# Patient Record
Sex: MALE | Race: WHITE | HISPANIC OR LATINO | Employment: FULL TIME | URBAN - METROPOLITAN AREA
[De-identification: names, ages, dates, MRNs, and addresses within clinical notes are randomized per-mention and may not be internally consistent; named-entity substitution may affect disease eponyms.]

---

## 2018-10-20 ENCOUNTER — OFFICE VISIT (OUTPATIENT)
Dept: URGENT CARE | Facility: CLINIC | Age: 63
End: 2018-10-20

## 2018-10-20 ENCOUNTER — HOSPITAL ENCOUNTER (EMERGENCY)
Facility: MEDICAL CENTER | Age: 63
End: 2018-10-20
Attending: EMERGENCY MEDICINE

## 2018-10-20 VITALS
WEIGHT: 180 LBS | HEIGHT: 64 IN | SYSTOLIC BLOOD PRESSURE: 148 MMHG | DIASTOLIC BLOOD PRESSURE: 80 MMHG | TEMPERATURE: 98.3 F | OXYGEN SATURATION: 93 % | BODY MASS INDEX: 30.73 KG/M2 | RESPIRATION RATE: 16 BRPM | HEART RATE: 69 BPM

## 2018-10-20 VITALS
SYSTOLIC BLOOD PRESSURE: 160 MMHG | OXYGEN SATURATION: 97 % | RESPIRATION RATE: 16 BRPM | DIASTOLIC BLOOD PRESSURE: 94 MMHG | TEMPERATURE: 96.7 F | WEIGHT: 180.56 LBS | HEART RATE: 72 BPM

## 2018-10-20 DIAGNOSIS — H15.002: ICD-10-CM

## 2018-10-20 DIAGNOSIS — H15.102: ICD-10-CM

## 2018-10-20 DIAGNOSIS — H57.12 EYE PAIN, LEFT: ICD-10-CM

## 2018-10-20 DIAGNOSIS — H57.89 REDNESS OF EYE, LEFT: ICD-10-CM

## 2018-10-20 PROCEDURE — 99203 OFFICE O/P NEW LOW 30 MIN: CPT | Performed by: FAMILY MEDICINE

## 2018-10-20 PROCEDURE — 302449 STATCHG TRIAGE ONLY (STATISTIC)

## 2018-10-20 PROCEDURE — 303754 HCHG EYE PATCH

## 2018-10-20 NOTE — PROGRESS NOTES
"Subjective:      CC: Thiago Ortega is a 63 y.o. male who presents with Conjunctivitis (x3 days, patients presents with severe redness, swelling, itching, burning )      HPI:  This is a new problem. Thiago Ortega is a 63 y.o. male who presents for today with his daughter-in-law for evaluation of left eye pain, swelling, and redness.  He reports that he first noticed some redness and \"burning pain\" of his left eye when he woke up on Thursday. He says that since that time it has been gradually worsening and he also now has pain around his his and is unable to keep it open. He says it has been watering a lot and that his vision is blurry with the watery discharge but that his vision returns to normal if the discharge is cleared. He denies any injury to the eye or recent illnesses. He does not wear contacts. He denies fever/chills, chest pain, shortness of breath, or lightheadedness/dizziness.        Review of Systems   Constitutional: Negative for chills, fever and malaise/fatigue.   HENT: Negative for congestion, ear pain and sore throat.    Eyes: Positive for blurred vision, pain, discharge (Watery) and redness.   Respiratory: Negative for cough and shortness of breath.    Cardiovascular: Negative for chest pain and palpitations.   Gastrointestinal: Negative for abdominal pain, constipation, diarrhea, nausea and vomiting.   Musculoskeletal: Negative for myalgias.   Neurological: Negative for dizziness, weakness and headaches.         PMH:  has no past medical history on file.  MEDS: No current outpatient prescriptions on file.  ALLERGIES: No Known Allergies  SURGHX: History reviewed. No pertinent surgical history.  SOCHX:  reports that he has never smoked. He has never used smokeless tobacco. He reports that he does not drink alcohol or use drugs.  FH: Family history was reviewed, no pertinent findings to report     Objective:     /80   Pulse 69   Temp 36.8 °C (98.3 °F)   Resp 16   Ht 1.626 m (5' " "4\")   Wt 81.6 kg (180 lb)   SpO2 93%   BMI 30.90 kg/m²        Physical Exam   Constitutional: He is oriented to person, place, and time. He appears well-developed and well-nourished.   HENT:   Head: Normocephalic and atraumatic.   Right Ear: Tympanic membrane, external ear and ear canal normal.   Left Ear: Tympanic membrane, external ear and ear canal normal.   Nose: Nose normal.   Mouth/Throat: Uvula is midline and oropharynx is clear and moist.   Eyes: Pupils are equal, round, and reactive to light. Lids are everted and swept, no foreign bodies found. Left eye exhibits discharge. Left conjunctiva is injected. Left conjunctiva has a hemorrhage.   Fundoscopic exam:       The right eye shows red reflex.        The left eye shows red reflex.   Slit lamp exam:       The left eye shows no corneal abrasion, no corneal ulcer, no foreign body, no hyphema, no hypopyon and no fluorescein uptake.   Right eye normal. Left eye exhibits some mild swelling of the upper and lower eyelids. Patient can not keep that eye open on his own due to discomfort. The eye is diffusely injected with a subconjunctival hemorrhage noted inferior to the cornea. The medial side of the cornea appears hazy. There is some watery discharge noted as well as some crusting of the eyelids. The surrounding orbit is mildly TTP but is non-erythematous. Patient reports some minor discomfort with EOMs.    Neck: Normal range of motion. Neck supple.   Cardiovascular: Normal rate, regular rhythm and normal heart sounds.    No murmur heard.  Pulmonary/Chest: Effort normal and breath sounds normal. He has no wheezes.   Lymphadenopathy:        Head (left side): Preauricular adenopathy present.     He has no cervical adenopathy.   Neurological: He is alert and oriented to person, place, and time.   Skin: Skin is warm and dry. Capillary refill takes less than 2 seconds.   Psychiatric: He has a normal mood and affect. His behavior is normal. Judgment normal.   Vitals " reviewed.         Assessment/Plan:     1. Redness of eye, left    2. Eye pain, left    3. Scleritis and episcleritis, left      Advised patient that based on his current symptoms and physical exam findings I suspect that he may have a component of scleritis and advised him to go to the ED to potentially be evaluated by an ophthalmologist. Patient's daughter in law will drive him over to the ED. Report was called over to an emergency room provider.    Case reviewed and discussed with Dr. Maynard during Tana Navarrete PA-C's training period

## 2018-10-20 NOTE — ED NOTES
RN went into room to assess patient. Patient requesting to leave and go see his eye MD instead. RN explained process of seeing MD here, patient insisting on leaving. AMA form signed. Patient ambulatory out of ED.

## 2018-10-21 ASSESSMENT — ENCOUNTER SYMPTOMS
DIZZINESS: 0
EYE PAIN: 1
MYALGIAS: 0
HEADACHES: 0
COUGH: 0
SORE THROAT: 0
VOMITING: 0
CONSTIPATION: 0
CHILLS: 0
PALPITATIONS: 0
WEAKNESS: 0
EYE REDNESS: 1
SHORTNESS OF BREATH: 0
BLURRED VISION: 1
ABDOMINAL PAIN: 0
EYE DISCHARGE: 1
FEVER: 0
DIARRHEA: 0
NAUSEA: 0